# Patient Record
Sex: MALE | URBAN - METROPOLITAN AREA
[De-identification: names, ages, dates, MRNs, and addresses within clinical notes are randomized per-mention and may not be internally consistent; named-entity substitution may affect disease eponyms.]

---

## 2017-01-27 ENCOUNTER — IMPORTED ENCOUNTER (OUTPATIENT)
Dept: URBAN - METROPOLITAN AREA CLINIC 50 | Facility: CLINIC | Age: 67
End: 2017-01-27

## 2017-02-09 ENCOUNTER — IMPORTED ENCOUNTER (OUTPATIENT)
Dept: URBAN - METROPOLITAN AREA CLINIC 50 | Facility: CLINIC | Age: 67
End: 2017-02-09

## 2017-02-28 ENCOUNTER — IMPORTED ENCOUNTER (OUTPATIENT)
Dept: URBAN - METROPOLITAN AREA CLINIC 50 | Facility: CLINIC | Age: 67
End: 2017-02-28

## 2017-03-02 ENCOUNTER — IMPORTED ENCOUNTER (OUTPATIENT)
Dept: URBAN - METROPOLITAN AREA CLINIC 50 | Facility: CLINIC | Age: 67
End: 2017-03-02

## 2017-03-21 ENCOUNTER — IMPORTED ENCOUNTER (OUTPATIENT)
Dept: URBAN - METROPOLITAN AREA CLINIC 50 | Facility: CLINIC | Age: 67
End: 2017-03-21

## 2017-03-30 ENCOUNTER — IMPORTED ENCOUNTER (OUTPATIENT)
Dept: URBAN - METROPOLITAN AREA CLINIC 50 | Facility: CLINIC | Age: 67
End: 2017-03-30

## 2017-04-04 ENCOUNTER — IMPORTED ENCOUNTER (OUTPATIENT)
Dept: URBAN - METROPOLITAN AREA CLINIC 50 | Facility: CLINIC | Age: 67
End: 2017-04-04

## 2017-04-13 ENCOUNTER — IMPORTED ENCOUNTER (OUTPATIENT)
Dept: URBAN - METROPOLITAN AREA CLINIC 50 | Facility: CLINIC | Age: 67
End: 2017-04-13

## 2017-05-04 ENCOUNTER — IMPORTED ENCOUNTER (OUTPATIENT)
Dept: URBAN - METROPOLITAN AREA CLINIC 50 | Facility: CLINIC | Age: 67
End: 2017-05-04

## 2017-05-16 ENCOUNTER — IMPORTED ENCOUNTER (OUTPATIENT)
Dept: URBAN - METROPOLITAN AREA CLINIC 50 | Facility: CLINIC | Age: 67
End: 2017-05-16

## 2017-10-26 ENCOUNTER — IMPORTED ENCOUNTER (OUTPATIENT)
Dept: URBAN - METROPOLITAN AREA CLINIC 50 | Facility: CLINIC | Age: 67
End: 2017-10-26

## 2018-01-18 ENCOUNTER — IMPORTED ENCOUNTER (OUTPATIENT)
Dept: URBAN - METROPOLITAN AREA CLINIC 50 | Facility: CLINIC | Age: 68
End: 2018-01-18

## 2018-01-25 ENCOUNTER — IMPORTED ENCOUNTER (OUTPATIENT)
Dept: URBAN - METROPOLITAN AREA CLINIC 50 | Facility: CLINIC | Age: 68
End: 2018-01-25

## 2020-01-03 ENCOUNTER — IMPORTED ENCOUNTER (OUTPATIENT)
Dept: URBAN - METROPOLITAN AREA CLINIC 50 | Facility: CLINIC | Age: 70
End: 2020-01-03

## 2020-01-24 ENCOUNTER — IMPORTED ENCOUNTER (OUTPATIENT)
Dept: URBAN - METROPOLITAN AREA CLINIC 50 | Facility: CLINIC | Age: 70
End: 2020-01-24

## 2020-08-17 ENCOUNTER — IMPORTED ENCOUNTER (OUTPATIENT)
Dept: URBAN - METROPOLITAN AREA CLINIC 50 | Facility: CLINIC | Age: 70
End: 2020-08-17

## 2021-02-15 ENCOUNTER — IMPORTED ENCOUNTER (OUTPATIENT)
Dept: URBAN - METROPOLITAN AREA CLINIC 50 | Facility: CLINIC | Age: 71
End: 2021-02-15

## 2021-04-23 ASSESSMENT — VISUAL ACUITY
OD_CC: J1@ 18 IN
OS_SC: 20/20-2
OD_SC: 20/30-1
OS_SC: 20/40
OS_CC: 20/20-
OD_SC: 20/30
OS_PH: @ 16 IN
OD_BAT: 20/40
OS_PH: @ 16 IN
OS_CC: J1@ 18 IN
OD_BAT: 20/40
OD_PH: @ 18 IN
OD_BAT: 20/30
OS_CC: J1NEAR
OD_CC: J2
OD_BAT: 20/70
OD_OTHER: 20/30. 20/40.
OS_SC: 20/30
OS_CC: J2
OD_SC: 20/30
OS_SC: 20/30
OD_CC: J1
OD_BAT: 20/40
OS_CC: J3@ 19 IN
OS_CC: J1
OS_SC: 20/100
OD_OTHER: 20/40. 20/60.
OS_PH: @ 14 IN
OD_PH: @ 16 IN
OD_SC: 20/50
OS_SC: 20/20
OD_SC: 20/30
OD_SC: 20/50
OD_CC: 20/30-
OD_CC: 20/100
OD_OTHER: 20/40. 20/60.
OD_SC: 20/25-3
OS_OTHER: 20/40. 20/60.
OD_OTHER: 20/40. 20/50.
OS_SC: 20/30+-
OS_CC: 20/400
OD_CC: J3@ 19 IN
OS_PH: @ 18 IN
OS_BAT: 20/40
OS_BAT: 20/40
OS_PH: 20/40
OS_BAT: 20/40
OS_CC: J2@ 18 IN
OS_CC: 20/70
OD_SC: 20/400
OS_SC: 20/20
OS_SC: 20/20-
OS_OTHER: 20/40.
OS_BAT: 20/40
OD_OTHER: 20/70.
OD_CC: J1NEAR
OD_PH: @ 14 IN
OS_OTHER: 20/40. 20/60.
OD_CC: J1-
OD_PH: 20/50
OS_OTHER: 20/40. 20/50.
OD_CC: 20/30-
OD_SC: 20/40+
OS_CC: 20/20
OD_SC: 20/40
OS_CC: J1-
OS_SC: 20/30
OD_SC: 20/30
OD_CC: J2@ 18 IN

## 2021-04-23 ASSESSMENT — TONOMETRY
OD_IOP_MMHG: 31
OS_IOP_MMHG: 14
OD_IOP_MMHG: 14
OD_IOP_MMHG: 14
OD_IOP_MMHG: 15
OS_IOP_MMHG: 13
OS_IOP_MMHG: 12
OS_IOP_MMHG: 14
OS_IOP_MMHG: 13
OD_IOP_MMHG: 14
OS_IOP_MMHG: 15
OD_IOP_MMHG: 12
OS_IOP_MMHG: 12
OD_IOP_MMHG: 12
OS_IOP_MMHG: 15
OD_IOP_MMHG: 13
OS_IOP_MMHG: 13
OD_IOP_MMHG: 13
OD_IOP_MMHG: 14
OD_IOP_MMHG: 11
OS_IOP_MMHG: 13
OS_IOP_MMHG: 12
OD_IOP_MMHG: 12
OD_IOP_MMHG: 14
OS_IOP_MMHG: 15
OD_IOP_MMHG: 14
OS_IOP_MMHG: 24
OS_IOP_MMHG: 13

## 2021-08-12 ENCOUNTER — PREPPED CHART (OUTPATIENT)
Dept: URBAN - METROPOLITAN AREA CLINIC 49 | Facility: CLINIC | Age: 71
End: 2021-08-12

## 2021-08-16 ENCOUNTER — DILATED FUNDUS EXAM (OUTPATIENT)
Dept: URBAN - METROPOLITAN AREA CLINIC 49 | Facility: CLINIC | Age: 71
End: 2021-08-16

## 2021-08-16 DIAGNOSIS — H35.3131: ICD-10-CM

## 2021-08-16 DIAGNOSIS — H35.373: ICD-10-CM

## 2021-08-16 PROCEDURE — 92134 CPTRZ OPH DX IMG PST SGM RTA: CPT

## 2021-08-16 PROCEDURE — 92014 COMPRE OPH EXAM EST PT 1/>: CPT

## 2021-08-16 ASSESSMENT — TONOMETRY
OS_IOP_MMHG: 12
OD_IOP_MMHG: 13

## 2021-08-16 ASSESSMENT — VISUAL ACUITY
OS_SC: 20/25-1
OD_SC: 20/25-1

## 2022-02-14 ENCOUNTER — COMPREHENSIVE EXAM (OUTPATIENT)
Dept: URBAN - METROPOLITAN AREA CLINIC 49 | Facility: CLINIC | Age: 72
End: 2022-02-14

## 2022-02-14 DIAGNOSIS — H35.3132: ICD-10-CM

## 2022-02-14 DIAGNOSIS — H35.363: ICD-10-CM

## 2022-02-14 DIAGNOSIS — H16.223: ICD-10-CM

## 2022-02-14 DIAGNOSIS — H35.3131: ICD-10-CM

## 2022-02-14 DIAGNOSIS — H43.813: ICD-10-CM

## 2022-02-14 PROCEDURE — 92014 COMPRE OPH EXAM EST PT 1/>: CPT

## 2022-02-14 PROCEDURE — 92134 CPTRZ OPH DX IMG PST SGM RTA: CPT

## 2022-02-14 ASSESSMENT — VISUAL ACUITY
OS_SC: 20/25
OS_SC: J2
OD_SC: J2
OU_SC: J1
OD_SC: 20/25
OU_SC: 20/25

## 2022-02-14 ASSESSMENT — TONOMETRY
OD_IOP_MMHG: 13
OS_IOP_MMHG: 12

## 2022-08-01 NOTE — PATIENT DISCUSSION
Discussed AREDS2 supplements, BP Control, UV protection and dark leafy green vegetables. Olumiant Counseling: I discussed with the patient the risks of Olumiant therapy including but not limited to upper respiratory tract infections, shingles, cold sores, and nausea. Live vaccines should be avoided.  This medication has been linked to serious infections; higher rate of mortality; malignancy and lymphoproliferative disorders; major adverse cardiovascular events; thrombosis; gastrointestinal perforations; neutropenia; lymphopenia; anemia; liver enzyme elevations; and lipid elevations.

## 2022-08-15 ENCOUNTER — COMPREHENSIVE EXAM (OUTPATIENT)
Dept: URBAN - METROPOLITAN AREA CLINIC 49 | Facility: CLINIC | Age: 72
End: 2022-08-15

## 2022-08-15 DIAGNOSIS — H16.223: ICD-10-CM

## 2022-08-15 DIAGNOSIS — H35.3132: ICD-10-CM

## 2022-08-15 DIAGNOSIS — H43.813: ICD-10-CM

## 2022-08-15 DIAGNOSIS — H35.363: ICD-10-CM

## 2022-08-15 DIAGNOSIS — H35.373: ICD-10-CM

## 2022-08-15 PROCEDURE — 92014 COMPRE OPH EXAM EST PT 1/>: CPT

## 2022-08-15 PROCEDURE — 92134 CPTRZ OPH DX IMG PST SGM RTA: CPT

## 2022-08-15 ASSESSMENT — TONOMETRY
OS_IOP_MMHG: 14
OD_IOP_MMHG: 15

## 2022-08-15 ASSESSMENT — VISUAL ACUITY
OD_SC: 20/25
OU_SC: J1+@18IN
OS_SC: 20/20

## 2023-08-07 ENCOUNTER — COMPREHENSIVE EXAM (OUTPATIENT)
Dept: URBAN - METROPOLITAN AREA CLINIC 49 | Facility: LOCATION | Age: 73
End: 2023-08-07

## 2023-08-07 DIAGNOSIS — H35.3132: ICD-10-CM

## 2023-08-07 DIAGNOSIS — H35.361: ICD-10-CM

## 2023-08-07 DIAGNOSIS — H18.593: ICD-10-CM

## 2023-08-07 DIAGNOSIS — H16.223: ICD-10-CM

## 2023-08-07 DIAGNOSIS — H43.813: ICD-10-CM

## 2023-08-07 DIAGNOSIS — H35.373: ICD-10-CM

## 2023-08-07 PROCEDURE — 99214 OFFICE O/P EST MOD 30 MIN: CPT

## 2023-08-07 PROCEDURE — 92134 CPTRZ OPH DX IMG PST SGM RTA: CPT

## 2023-08-07 ASSESSMENT — KERATOMETRY
OS_K1POWER_DIOPTERS: 41.00
OD_AXISANGLE2_DEGREES: 10
OS_AXISANGLE2_DEGREES: 103
OS_AXISANGLE_DEGREES: 13
OD_AXISANGLE_DEGREES: 100
OD_K1POWER_DIOPTERS: 41.25
OD_K2POWER_DIOPTERS: 41.50
OS_K2POWER_DIOPTERS: 41.50

## 2023-08-07 ASSESSMENT — TONOMETRY
OS_IOP_MMHG: 14
OD_IOP_MMHG: 14

## 2023-08-07 ASSESSMENT — VISUAL ACUITY
OS_SC: 20/25
OU_SC: J1-2
OD_SC: 20/25

## 2024-02-05 ENCOUNTER — COMPREHENSIVE EXAM (OUTPATIENT)
Dept: URBAN - METROPOLITAN AREA CLINIC 49 | Facility: LOCATION | Age: 74
End: 2024-02-05

## 2024-02-05 DIAGNOSIS — H16.223: ICD-10-CM

## 2024-02-05 DIAGNOSIS — H43.813: ICD-10-CM

## 2024-02-05 DIAGNOSIS — H52.4: ICD-10-CM

## 2024-02-05 DIAGNOSIS — H35.3132: ICD-10-CM

## 2024-02-05 DIAGNOSIS — H18.593: ICD-10-CM

## 2024-02-05 DIAGNOSIS — H35.373: ICD-10-CM

## 2024-02-05 PROCEDURE — 92134 CPTRZ OPH DX IMG PST SGM RTA: CPT

## 2024-02-05 PROCEDURE — 99214 OFFICE O/P EST MOD 30 MIN: CPT

## 2024-02-05 ASSESSMENT — VISUAL ACUITY
OS_SC: 20/25+
OD_SC: 20/25-2
OU_SC: J1-2

## 2024-02-05 ASSESSMENT — KERATOMETRY
OS_AXISANGLE2_DEGREES: 103
OS_AXISANGLE_DEGREES: 13
OD_K1POWER_DIOPTERS: 41.25
OS_K2POWER_DIOPTERS: 41.50
OD_AXISANGLE2_DEGREES: 10
OD_K2POWER_DIOPTERS: 41.50
OD_AXISANGLE_DEGREES: 100
OS_K1POWER_DIOPTERS: 41.00

## 2024-02-05 ASSESSMENT — TONOMETRY
OD_IOP_MMHG: 14
OS_IOP_MMHG: 14

## 2024-08-05 ENCOUNTER — COMPREHENSIVE EXAM (OUTPATIENT)
Dept: URBAN - METROPOLITAN AREA CLINIC 49 | Facility: LOCATION | Age: 74
End: 2024-08-05

## 2024-08-05 DIAGNOSIS — H52.4: ICD-10-CM

## 2024-08-05 DIAGNOSIS — H35.373: ICD-10-CM

## 2024-08-05 DIAGNOSIS — H18.593: ICD-10-CM

## 2024-08-05 DIAGNOSIS — H16.223: ICD-10-CM

## 2024-08-05 DIAGNOSIS — H35.3132: ICD-10-CM

## 2024-08-05 DIAGNOSIS — H35.361: ICD-10-CM

## 2024-08-05 DIAGNOSIS — H43.813: ICD-10-CM

## 2024-08-05 PROCEDURE — 92015 DETERMINE REFRACTIVE STATE: CPT

## 2024-08-05 PROCEDURE — 92134 CPTRZ OPH DX IMG PST SGM RTA: CPT

## 2024-08-05 PROCEDURE — 99214 OFFICE O/P EST MOD 30 MIN: CPT

## 2024-08-05 ASSESSMENT — VISUAL ACUITY
OS_SC: 20/25
OU_SC: J3 @ 14 IN
OD_SC: 20/30

## 2024-08-05 ASSESSMENT — KERATOMETRY
OD_K2POWER_DIOPTERS: 41.50
OD_AXISANGLE2_DEGREES: 10
OD_AXISANGLE_DEGREES: 100
OS_AXISANGLE2_DEGREES: 103
OS_K2POWER_DIOPTERS: 41.50
OS_AXISANGLE_DEGREES: 13
OD_K1POWER_DIOPTERS: 41.25
OS_K1POWER_DIOPTERS: 41.00

## 2024-08-05 ASSESSMENT — TONOMETRY
OD_IOP_MMHG: 15
OS_IOP_MMHG: 16

## 2025-02-10 ENCOUNTER — COMPREHENSIVE EXAM (OUTPATIENT)
Age: 75
End: 2025-02-10

## 2025-02-10 DIAGNOSIS — H16.223: ICD-10-CM

## 2025-02-10 DIAGNOSIS — H35.3132: ICD-10-CM

## 2025-02-10 DIAGNOSIS — H35.361: ICD-10-CM

## 2025-02-10 DIAGNOSIS — H52.4: ICD-10-CM

## 2025-02-10 DIAGNOSIS — H35.373: ICD-10-CM

## 2025-02-10 DIAGNOSIS — H43.813: ICD-10-CM

## 2025-02-10 DIAGNOSIS — H18.593: ICD-10-CM

## 2025-02-10 PROCEDURE — 92134 CPTRZ OPH DX IMG PST SGM RTA: CPT

## 2025-02-10 PROCEDURE — 99214 OFFICE O/P EST MOD 30 MIN: CPT
